# Patient Record
Sex: FEMALE | HISPANIC OR LATINO | Employment: UNEMPLOYED | URBAN - METROPOLITAN AREA
[De-identification: names, ages, dates, MRNs, and addresses within clinical notes are randomized per-mention and may not be internally consistent; named-entity substitution may affect disease eponyms.]

---

## 2022-03-07 ENCOUNTER — OFFICE VISIT (OUTPATIENT)
Dept: FAMILY MEDICINE CLINIC | Facility: CLINIC | Age: 47
End: 2022-03-07

## 2022-03-07 VITALS
SYSTOLIC BLOOD PRESSURE: 110 MMHG | HEIGHT: 62 IN | DIASTOLIC BLOOD PRESSURE: 70 MMHG | HEART RATE: 70 BPM | BODY MASS INDEX: 28.82 KG/M2 | WEIGHT: 156.6 LBS | OXYGEN SATURATION: 99 % | TEMPERATURE: 97.5 F

## 2022-03-07 DIAGNOSIS — N92.1 MENORRHAGIA WITH IRREGULAR CYCLE: ICD-10-CM

## 2022-03-07 DIAGNOSIS — R53.83 FATIGUE, UNSPECIFIED TYPE: Primary | ICD-10-CM

## 2022-03-07 PROCEDURE — 99203 OFFICE O/P NEW LOW 30 MIN: CPT | Performed by: FAMILY MEDICINE

## 2022-03-07 RX ORDER — DIPHENOXYLATE HYDROCHLORIDE AND ATROPINE SULFATE 2.5; .025 MG/1; MG/1
1 TABLET ORAL DAILY
COMMUNITY

## 2022-03-07 RX ORDER — FERROUS SULFATE 325(65) MG
325 TABLET ORAL
COMMUNITY

## 2022-03-07 RX ORDER — ERGOCALCIFEROL 1.25 MG/1
1000 CAPSULE ORAL
COMMUNITY

## 2022-03-07 NOTE — PROGRESS NOTES
"Chief Complaint  Establish Care (Wants to check on menopause also had covid in January and hasnt felt normal since)    Subjective          Rama Meza presents to [facility] to establish care for menopause and anemia. She is a new patient to me today. Patient agrees to using [facility].     History of Present Illness    Patient reports a chronic history of anemia and reports her hemoglobin is rarely within normal range. Her last hemoglobin value was 12.5 in 2021. She also reports frequent fatigue which has recently improved with daily iron supplements 25 mg within the last 6 weeks. She reports previously doing at least 3 hours of exercise daily between tennis, yoga and jazzercise but is unable to participate as much due to fatigue. Patient had a metabolic panel performed in 2021 which was all in normal range.     She was positive for COVID in 2022 and continues to experience residual symptoms. She reports feeling shortness of breath and lightheaded often with activity. She denies chest pain.     Patient reports her menstrual cycles are becoming heavier, lasting longer and have become more frequent since 2021. She notes her hands and feet feeling extremely cold, followed by cold sweats several nights before her periods onset. She also reports sleep disturbance and frequent headaches and dizziness. Patient also occasionally wakes up with her head pulsing as if 'her heart was in her head'.     Patient states she has been experiencing sore throat for 2 to 3 days.     Patient complains of 3 to 4 UTIs a year which she describes as painful.     Patient also reports being borderline diabetic in the past    Patient reports a surgical history of , wisdom tooth removal and dry eye surgery    Objective      Vital Signs:   /70   Pulse 70   Temp 97.5 °F (36.4 °C)   Ht 157.5 cm (62\")   Wt 71 kg (156 lb 9.6 oz)   SpO2 99%   BMI 28.64 kg/m²       Physical Exam  Vitals and nursing " note reviewed.   Constitutional:       Appearance: Normal appearance. She is well-developed.   HENT:      Head: Normocephalic and atraumatic.      Right Ear: External ear normal.      Left Ear: External ear normal.      Nose: Nose normal.   Eyes:      General: No scleral icterus.     Conjunctiva/sclera: Conjunctivae normal.   Cardiovascular:      Rate and Rhythm: Normal rate and regular rhythm.      Heart sounds: Normal heart sounds.   Pulmonary:      Effort: Pulmonary effort is normal.      Breath sounds: Normal breath sounds.   Musculoskeletal:      Cervical back: Normal range of motion and neck supple.      Right lower leg: No edema.      Left lower leg: No edema.   Lymphadenopathy:      Cervical: No cervical adenopathy.   Skin:     General: Skin is warm and dry.      Findings: No rash.   Neurological:      Mental Status: She is alert and oriented to person, place, and time.   Psychiatric:         Mood and Affect: Mood normal.         Behavior: Behavior normal.         Thought Content: Thought content normal.         Judgment: Judgment normal.        Result Review :                 Assessment and Plan    Diagnoses and all orders for this visit:    1. Fatigue, unspecified type (Primary)  Perform thyroid panel, metabolic panel, iron levels and CBC.  Patient to continue daily iron supplements.     2. Menorrhagia with irregular cycle  Encouraged patient to hydrate adequately.  Patient is likely starting perimenopausal symptoms.  Reassurance given.  Discussed hygiene tips.  Discussed cranberry tablets.         Follow Up   Return in about 3 months (around 6/7/2022) for Annual physical.    Patient was given instructions and counseling regarding her condition or for health maintenance advice. Please see specific information pulled into the AVS if appropriate.     Transcribed from ambient dictation for Dionna Diaz DO by Katelin Silva.  03/08/22   20:45 EST    Patient verbalized consent to the visit recording.

## 2022-03-08 LAB
ALBUMIN SERPL-MCNC: 4.3 G/DL (ref 3.8–4.8)
ALBUMIN/GLOB SERPL: 1.7 {RATIO} (ref 1.2–2.2)
ALP SERPL-CCNC: 50 IU/L (ref 44–121)
ALT SERPL-CCNC: 9 IU/L (ref 0–32)
AST SERPL-CCNC: 21 IU/L (ref 0–40)
BASOPHILS # BLD AUTO: 0.1 X10E3/UL (ref 0–0.2)
BASOPHILS NFR BLD AUTO: 1 %
BILIRUB SERPL-MCNC: 0.2 MG/DL (ref 0–1.2)
BUN SERPL-MCNC: 14 MG/DL (ref 6–24)
BUN/CREAT SERPL: 15 (ref 9–23)
CALCIUM SERPL-MCNC: 9.5 MG/DL (ref 8.7–10.2)
CHLORIDE SERPL-SCNC: 102 MMOL/L (ref 96–106)
CO2 SERPL-SCNC: 23 MMOL/L (ref 20–29)
CREAT SERPL-MCNC: 0.95 MG/DL (ref 0.57–1)
EGFR GENE MUT ANL BLD/T: 74 ML/MIN/1.73
EOSINOPHIL # BLD AUTO: 0 X10E3/UL (ref 0–0.4)
EOSINOPHIL NFR BLD AUTO: 1 %
ERYTHROCYTE [DISTWIDTH] IN BLOOD BY AUTOMATED COUNT: 13.3 % (ref 11.7–15.4)
FERRITIN SERPL-MCNC: 36 NG/ML (ref 15–150)
GLOBULIN SER CALC-MCNC: 2.5 G/DL (ref 1.5–4.5)
GLUCOSE SERPL-MCNC: 83 MG/DL (ref 65–99)
HCT VFR BLD AUTO: 36.5 % (ref 34–46.6)
HGB BLD-MCNC: 12.4 G/DL (ref 11.1–15.9)
IMM GRANULOCYTES # BLD AUTO: 0 X10E3/UL (ref 0–0.1)
IMM GRANULOCYTES NFR BLD AUTO: 0 %
IRON SATN MFR SERPL: 32 % (ref 15–55)
IRON SERPL-MCNC: 102 UG/DL (ref 27–159)
LYMPHOCYTES # BLD AUTO: 2.2 X10E3/UL (ref 0.7–3.1)
LYMPHOCYTES NFR BLD AUTO: 27 %
MCH RBC QN AUTO: 31.9 PG (ref 26.6–33)
MCHC RBC AUTO-ENTMCNC: 34 G/DL (ref 31.5–35.7)
MCV RBC AUTO: 94 FL (ref 79–97)
MONOCYTES # BLD AUTO: 0.5 X10E3/UL (ref 0.1–0.9)
MONOCYTES NFR BLD AUTO: 6 %
NEUTROPHILS # BLD AUTO: 5.3 X10E3/UL (ref 1.4–7)
NEUTROPHILS NFR BLD AUTO: 65 %
PLATELET # BLD AUTO: 217 X10E3/UL (ref 150–450)
POTASSIUM SERPL-SCNC: 4.2 MMOL/L (ref 3.5–5.2)
PROT SERPL-MCNC: 6.8 G/DL (ref 6–8.5)
RBC # BLD AUTO: 3.89 X10E6/UL (ref 3.77–5.28)
SODIUM SERPL-SCNC: 141 MMOL/L (ref 134–144)
TIBC SERPL-MCNC: 319 UG/DL (ref 250–450)
TSH SERPL DL<=0.005 MIU/L-ACNC: 2.6 UIU/ML (ref 0.45–4.5)
UIBC SERPL-MCNC: 217 UG/DL (ref 131–425)
WBC # BLD AUTO: 8.1 X10E3/UL (ref 3.4–10.8)

## 2022-03-09 ENCOUNTER — PATIENT ROUNDING (BHMG ONLY) (OUTPATIENT)
Dept: FAMILY MEDICINE CLINIC | Facility: CLINIC | Age: 47
End: 2022-03-09

## 2022-03-28 ENCOUNTER — OFFICE VISIT (OUTPATIENT)
Dept: OBSTETRICS AND GYNECOLOGY | Facility: CLINIC | Age: 47
End: 2022-03-28

## 2022-03-28 ENCOUNTER — PATIENT ROUNDING (BHMG ONLY) (OUTPATIENT)
Dept: OBSTETRICS AND GYNECOLOGY | Facility: CLINIC | Age: 47
End: 2022-03-28

## 2022-03-28 VITALS
HEIGHT: 62 IN | BODY MASS INDEX: 28.71 KG/M2 | DIASTOLIC BLOOD PRESSURE: 54 MMHG | WEIGHT: 156 LBS | SYSTOLIC BLOOD PRESSURE: 102 MMHG

## 2022-03-28 DIAGNOSIS — Z11.51 SPECIAL SCREENING EXAMINATION FOR HUMAN PAPILLOMAVIRUS (HPV): ICD-10-CM

## 2022-03-28 DIAGNOSIS — N92.0 MENORRHAGIA WITH REGULAR CYCLE: ICD-10-CM

## 2022-03-28 DIAGNOSIS — Z01.419 ROUTINE GYNECOLOGICAL EXAMINATION: Primary | ICD-10-CM

## 2022-03-28 DIAGNOSIS — Z01.419 PAP SMEAR, LOW-RISK: ICD-10-CM

## 2022-03-28 LAB
BILIRUB BLD-MCNC: NEGATIVE MG/DL
CLARITY, POC: CLEAR
COLOR UR: YELLOW
GLUCOSE UR STRIP-MCNC: NEGATIVE MG/DL
KETONES UR QL: NEGATIVE
LEUKOCYTE EST, POC: NEGATIVE
NITRITE UR-MCNC: NEGATIVE MG/ML
PH UR: 5 [PH] (ref 5–8)
PROT UR STRIP-MCNC: NEGATIVE MG/DL
RBC # UR STRIP: NEGATIVE /UL
SP GR UR: 1.02 (ref 1–1.03)
UROBILINOGEN UR QL: NORMAL

## 2022-03-28 PROCEDURE — 81002 URINALYSIS NONAUTO W/O SCOPE: CPT | Performed by: OBSTETRICS & GYNECOLOGY

## 2022-03-28 PROCEDURE — 99386 PREV VISIT NEW AGE 40-64: CPT | Performed by: OBSTETRICS & GYNECOLOGY

## 2022-03-28 RX ORDER — NORETHINDRONE ACETATE AND ETHINYL ESTRADIOL, ETHINYL ESTRADIOL AND FERROUS FUMARATE 1MG-10(24)
1 KIT ORAL DAILY
Qty: 28 TABLET | Refills: 11 | Status: SHIPPED | OUTPATIENT
Start: 2022-03-28 | End: 2022-04-19

## 2022-03-28 NOTE — PROGRESS NOTES
GYN Annual Exam     CC- Here for annual exam.     Rama Meza is a 46 y.o. female who presents for annual well woman exam. Pt is a new pt to me. She has not had a pap smear in many years. She presents with menstrual cycle complaints. Periods are every 3-4 weeks, lasting 3-10 days. Dysmenorrhea:moderate starting 3 days prior to cycle. Cyclic symptoms include none. No intermenstrual bleeding, spotting, or discharge.  Patient is sexually active  yes -  . Patient is satisfied with her contraception yes - condoms.     OB History    No obstetric history on file.           Current contraception: condoms  History of abnormal Pap smear: no  History of abnormal mammogram: no  Family history of uterine, colon or ovarian cancer: no  Family history of breast cancer: yes - 3 maternal aunts- unknown bc they live in [facility] Maintenance   Topic Date Due   • Annual Gynecologic Pelvic and Breast Exam  Never done   • COLORECTAL CANCER SCREENING  Never done   • ANNUAL PHYSICAL  Never done   • TDAP/TD VACCINES (3 - Tdap) 10/23/2018   • INFLUENZA VACCINE  Never done   • COVID-19 Vaccine (3 - Booster for Pfizer series) 2021   • HEPATITIS C SCREENING  Never done   • PAP SMEAR  Never done   • Pneumococcal Vaccine 0-64  Aged Out       Past Medical History:   Diagnosis Date   • Anemia        Past Surgical History:   Procedure Laterality Date   •  SECTION           Current Outpatient Medications:   •  Ascorbic Acid 500 MG chewable tablet, Chew 500 mg., Disp: , Rfl:   •  Cranberry 1000 MG capsule, Take  by mouth., Disp: , Rfl:   •  ergocalciferol (ERGOCALCIFEROL) 1.25 MG (69347 UT) capsule, Take 1,000 Units by mouth., Disp: , Rfl:   •  ferrous sulfate 325 (65 FE) MG tablet, Take 325 mg by mouth Daily With Breakfast., Disp: , Rfl:   •  Multiple Vitamins-Minerals (b complex-C-E-zinc) tablet, Take 1 tablet by mouth Daily., Disp: , Rfl:   •  multivitamin (THERAGRAN) tablet tablet, Take 1 tablet by mouth Daily.,  "Disp: , Rfl:   •  Norethin-Eth Estrad-Fe Biphas (Lo Loestrin Fe) 1 MG-10 MCG / 10 MCG tablet, Take 1 tablet by mouth Daily., Disp: 28 tablet, Rfl: 11    Allergies   Allergen Reactions   • Caffeine Other (See Comments)     Lose eyesight for 4-5 hours  Lose eyesight for 4-5 hours     • Gluten Meal Dizziness   • Lactase-Lactobacillus Dizziness   • Amoxicillin Palpitations and Rash   • Biaxin [Clarithromycin] Palpitations and Rash       Social History     Tobacco Use   • Smoking status: Never Smoker   • Smokeless tobacco: Never Used   Vaping Use   • Vaping Use: Never used   Substance Use Topics   • Alcohol use: Yes     Alcohol/week: 1.0 standard drink     Types: 1 Standard drinks or equivalent per week     Comment: rarely   • Drug use: Never       Family History   Problem Relation Age of Onset   • Hyperlipidemia Mother    • Hypertension Mother    • Kidney disease Father    • Hyperlipidemia Father    • Hypertension Father    • Cancer Maternal Aunt    • Hypertension Maternal Grandmother    • Hyperlipidemia Maternal Grandmother    • Vision loss Maternal Grandfather    • Hypertension Maternal Grandfather    • Hyperlipidemia Maternal Grandfather    • Hypertension Paternal Grandmother    • Hyperlipidemia Paternal Grandmother    • Heart disease Paternal Grandmother    • Hypertension Paternal Grandfather    • Hyperlipidemia Paternal Grandfather        Review of Systems   Constitutional: Negative for appetite change, chills, fatigue, fever and unexpected weight change.   Gastrointestinal: Negative for abdominal distention, abdominal pain, anal bleeding, blood in stool, constipation, diarrhea, nausea and vomiting.   Genitourinary: Positive for menstrual problem. Negative for dyspareunia, dysuria, pelvic pain, vaginal bleeding, vaginal discharge and vaginal pain.       /54   Ht 157.5 cm (62.01\")   Wt 70.8 kg (156 lb)   LMP 03/19/2022   Breastfeeding No   BMI 28.53 kg/m²     Physical Exam  Vitals reviewed. "   Constitutional:       Appearance: She is well-developed.   HENT:      Mouth/Throat:      Dentition: Normal dentition. No dental caries.   Cardiovascular:      Rate and Rhythm: Normal rate and regular rhythm.      Heart sounds: Normal heart sounds.   Pulmonary:      Effort: Pulmonary effort is normal. No respiratory distress.      Breath sounds: Normal breath sounds. No stridor. No wheezing.   Chest:   Breasts:      Right: No inverted nipple, mass, nipple discharge, skin change or tenderness.      Left: No inverted nipple, mass, nipple discharge, skin change or tenderness.       Abdominal:      General: There is no distension.      Palpations: Abdomen is soft. There is no mass.      Tenderness: There is no abdominal tenderness.   Genitourinary:     Labia:         Right: No rash, tenderness or lesion.         Left: No rash, tenderness or lesion.       Vagina: No vaginal discharge, tenderness or bleeding.      Cervix: No cervical motion tenderness, discharge or friability.      Uterus: Not deviated, not enlarged, not fixed and not tender.       Adnexa:         Right: No mass, tenderness or fullness.          Left: No mass, tenderness or fullness.     Musculoskeletal:         General: No tenderness. Normal range of motion.   Skin:     General: Skin is warm.      Findings: No erythema or rash.   Neurological:      General: No focal deficit present.      Mental Status: She is alert and oriented to person, place, and time. Mental status is at baseline.      Cranial Nerves: No cranial nerve deficit.      Coordination: Coordination normal.   Psychiatric:         Mood and Affect: Mood normal.         Behavior: Behavior normal.         Thought Content: Thought content normal.         Judgment: Judgment normal.            Assessment/Plan    1) GYN HM: check pap smear. Check MMG.  SBE demonstrated and encouraged.  2) AUB: has had hx of anemia and is on iron daily. Her most recent CBC, ferritin and TSH are all normal. Rx Lo  Loestrin  3) Contraception: Condoms  4) Family Planning:  14yo son  5) Diet and Exercise discussed  6) Smoking Status: nonsmoker  7) Social: Katie Ruiz is her friend. Her son goes to [employer]  8) Follow up prn and 1 year       Diagnoses and all orders for this visit:    Routine gynecological examination  -     POC Urinalysis Dipstick  -     Mammo Screening Bilateral With CAD; Future    Pap smear, low-risk  -     IgP, Aptima HPV    Special screening examination for human papillomavirus (HPV)  -     IgP, Aptima HPV    Menorrhagia with regular cycle    Other orders  -     Multiple Vitamins-Minerals (b complex-C-E-zinc) tablet; Take 1 tablet by mouth Daily.  -     Cranberry 1000 MG capsule; Take  by mouth.  -     Norethin-Eth Estrad-Fe Biphas (Lo Loestrin Fe) 1 MG-10 MCG / 10 MCG tablet; Take 1 tablet by mouth Daily.          Ileana Garcias,   3/28/2022  13:40 EDT

## 2022-03-28 NOTE — PROGRESS NOTES
A MY-CHART MESSAGE HAS BEEN SENT TO THE PATIENT FOR PATIENT ROUNDING WITH [facility]

## 2022-04-01 LAB
CYTOLOGIST CVX/VAG CYTO: NORMAL
CYTOLOGY CVX/VAG DOC CYTO: NORMAL
CYTOLOGY CVX/VAG DOC THIN PREP: NORMAL
DX ICD CODE: NORMAL
HIV 1 & 2 AB SER-IMP: NORMAL
HPV I/H RISK 4 DNA CVX QL PROBE+SIG AMP: NEGATIVE
OTHER STN SPEC: NORMAL
STAT OF ADQ CVX/VAG CYTO-IMP: NORMAL

## 2022-04-15 ENCOUNTER — HOSPITAL ENCOUNTER (OUTPATIENT)
Dept: MAMMOGRAPHY | Facility: HOSPITAL | Age: 47
Discharge: HOME | End: 2022-04-15
Admitting: OBSTETRICS & GYNECOLOGY

## 2022-04-15 DIAGNOSIS — Z01.419 ROUTINE GYNECOLOGICAL EXAMINATION: ICD-10-CM

## 2022-04-15 PROCEDURE — 77067 SCR MAMMO BI INCL CAD: CPT

## 2022-04-18 ENCOUNTER — TELEPHONE (OUTPATIENT)
Dept: OBSTETRICS AND GYNECOLOGY | Facility: CLINIC | Age: 47
End: 2022-04-18

## 2022-04-18 NOTE — TELEPHONE ENCOUNTER
Patient calling states the Lo Lo you prescribed for is to expensive and would like to know if there is something else you can give her?

## 2022-04-19 RX ORDER — NORETHINDRONE ACETATE AND ETHINYL ESTRADIOL AND FERROUS FUMARATE 1MG-20(24)
1 KIT ORAL DAILY
Qty: 84 TABLET | Refills: 3 | Status: SHIPPED | OUTPATIENT
Start: 2022-04-19 | End: 2022-10-03

## 2022-04-19 NOTE — TELEPHONE ENCOUNTER
I can prescribe the Loestrin instead of the Lo Loestrin. It is a little higher of a dosage but there is a generic. Let me know if she wants me to prescribe it.

## 2022-10-03 ENCOUNTER — OFFICE VISIT (OUTPATIENT)
Dept: FAMILY MEDICINE CLINIC | Facility: CLINIC | Age: 47
End: 2022-10-03

## 2022-10-03 VITALS
BODY MASS INDEX: 28.16 KG/M2 | OXYGEN SATURATION: 98 % | SYSTOLIC BLOOD PRESSURE: 108 MMHG | TEMPERATURE: 98.2 F | HEIGHT: 62 IN | HEART RATE: 87 BPM | WEIGHT: 153 LBS | DIASTOLIC BLOOD PRESSURE: 70 MMHG

## 2022-10-03 DIAGNOSIS — D37.01 NEOPLASM OF UNCERTAIN BEHAVIOR OF LIP: ICD-10-CM

## 2022-10-03 DIAGNOSIS — Z12.11 SCREENING FOR MALIGNANT NEOPLASM OF COLON: ICD-10-CM

## 2022-10-03 DIAGNOSIS — R41.3 MEMORY LOSS: ICD-10-CM

## 2022-10-03 DIAGNOSIS — Z00.00 ENCOUNTER FOR WELLNESS EXAMINATION IN ADULT: Primary | ICD-10-CM

## 2022-10-03 DIAGNOSIS — Z13.6 ENCOUNTER FOR LIPID SCREENING FOR CARDIOVASCULAR DISEASE: ICD-10-CM

## 2022-10-03 DIAGNOSIS — Z13.220 ENCOUNTER FOR LIPID SCREENING FOR CARDIOVASCULAR DISEASE: ICD-10-CM

## 2022-10-03 DIAGNOSIS — Z86.2 HISTORY OF IRON DEFICIENCY ANEMIA: ICD-10-CM

## 2022-10-03 PROCEDURE — 99396 PREV VISIT EST AGE 40-64: CPT | Performed by: FAMILY MEDICINE

## 2022-10-03 NOTE — PROGRESS NOTES
Subjective   Rama Meza is a 46 y.o. female who presents for annual female wellness exam.  Chief Complaint   Patient presents with   • Annual Exam     No pap    Patient has a history of anemia due to heavy menstrual cycles.  She currently is taking iron.    Patient also reports issues over the last several months with memory loss.  The patient states that she always has to use Google maps when she goes anywhere because she will get lost.  She has trouble recalling events from years ago.  Patient states that she has to write everything down.  She started noticing this when she was not sleeping well.  She has been attributing it to a poor sleep.  Patient does not feel distracted.    Patient has already made an appointment with dermatology for a mole on her lip.  She would like referral.  She will be seen next week.     Menstrual History: regular but heavy menses. Monthly. [facility]  Pregnancy History:   Sexual History: Monogamous male partner for the past 28 years.  Contraception: condoms  Diet: Healthy low carb, lots of veggies  Exercise: tennis 5 times a week and Jazzersize 2-3 times a week  Do you feel safe? yes    Mammogram: 4/15/2022, negative  Pap Smear: 3/28/2022 pap and HPV negative  Bone Density: NA  Colon Cancer Screening: No FH of colon CA.      Immunization History   Administered Date(s) Administered   • COVID-19 (PFIZER) PURPLE CAP 2021       The following portions of the patient's history were reviewed and updated as appropriate: allergies, current medications, past family history, past medical history, past social history, past surgical history and problem list.    Past Medical History:   Diagnosis Date   • Allergic     Seasonal, started when moved to [place]   • Anemia    • Clotting disorder (HCC) 2021    Very heavy periods   • Scoliosis Teenage years    Also lordosis   • Urinary tract infection     About 3 or 4 per year   • Visual impairment 2015    Severe dry eye       Past  Surgical History:   Procedure Laterality Date   •  SECTION     • EYE SURGERY  2017    Stint for dry eye       Family History   Problem Relation Age of Onset   • Hyperlipidemia Mother    • Hypertension Mother    • Kidney disease Father    • Hyperlipidemia Father    • Hypertension Father    • Cancer Maternal Aunt    • Hypertension Maternal Grandmother    • Hyperlipidemia Maternal Grandmother    • Arthritis Maternal Grandmother    • Vision loss Maternal Grandfather    • Hypertension Maternal Grandfather    • Hyperlipidemia Maternal Grandfather    • Hypertension Paternal Grandmother    • Hyperlipidemia Paternal Grandmother    • Heart disease Paternal Grandmother    • Stroke Paternal Grandmother    • Hypertension Paternal Grandfather    • Hyperlipidemia Paternal Grandfather        Social History     Socioeconomic History   • Marital status:    Tobacco Use   • Smoking status: Never Smoker   • Smokeless tobacco: Never Used   Vaping Use   • Vaping Use: Never used   Substance and Sexual Activity   • Alcohol use: Yes     Alcohol/week: 1.0 standard drink     Types: 1 Shots of liquor per week     Comment: Single malt scotch maybe 1x or 2x per week   • Drug use: Never   • Sexual activity: Yes     Partners: Male     Birth control/protection: Condom       Review of Systems   Constitutional: Negative for activity change, appetite change, fatigue and unexpected weight change.   HENT: Negative for congestion, ear pain, nosebleeds, sore throat and tinnitus.    Eyes: Negative for pain, redness and visual disturbance.   Respiratory: Negative for cough, shortness of breath and wheezing.    Cardiovascular: Negative for chest pain, palpitations and leg swelling.   Gastrointestinal: Negative for abdominal pain, blood in stool and nausea.   Endocrine: Negative for polydipsia and polyuria.   Genitourinary: Negative for dysuria, frequency, menstrual problem and vaginal discharge.   Musculoskeletal: Negative for arthralgias,  joint swelling and myalgias.   Skin: Negative for rash.   Allergic/Immunologic: Negative for environmental allergies, food allergies and immunocompromised state.   Neurological: Negative for dizziness, speech difficulty, weakness and headaches.   Hematological: Negative for adenopathy. Does not bruise/bleed easily.   Psychiatric/Behavioral: Negative for decreased concentration and dysphoric mood. The patient is not nervous/anxious.        Objective   Vitals:    10/03/22 1043   BP: 108/70   Pulse: 87   Temp: 98.2 °F (36.8 °C)   SpO2: 98%     Body mass index is 27.98 kg/m².  Physical Exam  Vitals and nursing note reviewed.   Constitutional:       Appearance: Normal appearance. She is well-developed.   HENT:      Head: Normocephalic and atraumatic.      Right Ear: External ear normal.      Left Ear: External ear normal.      Nose: Nose normal.   Eyes:      General: No scleral icterus.     Conjunctiva/sclera: Conjunctivae normal.   Cardiovascular:      Rate and Rhythm: Normal rate and regular rhythm.      Heart sounds: Normal heart sounds.   Pulmonary:      Effort: Pulmonary effort is normal.      Breath sounds: Normal breath sounds.   Musculoskeletal:      Cervical back: Normal range of motion and neck supple.      Right lower leg: No edema.      Left lower leg: No edema.   Lymphadenopathy:      Cervical: No cervical adenopathy.   Skin:     General: Skin is warm and dry.      Findings: No rash.   Neurological:      Mental Status: She is alert and oriented to person, place, and time.   Psychiatric:         Mood and Affect: Mood normal.         Behavior: Behavior normal.         Thought Content: Thought content normal.         Judgment: Judgment normal.           Assessment & Plan   Diagnoses and all orders for this visit:    1. Encounter for wellness examination in adult (Primary)    2. History of iron deficiency anemia  -     CBC & Differential    3. Screening for malignant neoplasm of colon  -     Cologuard - Stool,  Per Rectum; Future    4. 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 Encounter for lipid screening for cardiovascular disease  -     Lipid Panel      Routine health maintenance.  Patient is up-to-date with mammogram and Pap smear.  She has not been screened for colon cancer.  She elects to have Cologuard.  Order was entered.    Discussed the importance of maintaining a healthy weight and getting regular exercise.  Educated patient on the benefits of healthy diet.  Advise follow-up annually for wellness exams.      There are no Patient Instructions on file for this visit.

## 2022-10-04 LAB
BASOPHILS # BLD AUTO: 0.1 X10E3/UL (ref 0–0.2)
BASOPHILS NFR BLD AUTO: 1 %
CHOLEST SERPL-MCNC: 235 MG/DL (ref 100–199)
EOSINOPHIL # BLD AUTO: 0.1 X10E3/UL (ref 0–0.4)
EOSINOPHIL NFR BLD AUTO: 1 %
ERYTHROCYTE [DISTWIDTH] IN BLOOD BY AUTOMATED COUNT: 13.1 % (ref 11.7–15.4)
HCT VFR BLD AUTO: 37.8 % (ref 34–46.6)
HDLC SERPL-MCNC: 56 MG/DL
HGB BLD-MCNC: 12.6 G/DL (ref 11.1–15.9)
IMM GRANULOCYTES # BLD AUTO: 0 X10E3/UL (ref 0–0.1)
IMM GRANULOCYTES NFR BLD AUTO: 0 %
LDLC SERPL CALC-MCNC: 159 MG/DL (ref 0–99)
LYMPHOCYTES # BLD AUTO: 2.8 X10E3/UL (ref 0.7–3.1)
LYMPHOCYTES NFR BLD AUTO: 40 %
MCH RBC QN AUTO: 32.2 PG (ref 26.6–33)
MCHC RBC AUTO-ENTMCNC: 33.3 G/DL (ref 31.5–35.7)
MCV RBC AUTO: 97 FL (ref 79–97)
MONOCYTES # BLD AUTO: 0.5 X10E3/UL (ref 0.1–0.9)
MONOCYTES NFR BLD AUTO: 7 %
NEUTROPHILS # BLD AUTO: 3.6 X10E3/UL (ref 1.4–7)
NEUTROPHILS NFR BLD AUTO: 51 %
PLATELET # BLD AUTO: 186 X10E3/UL (ref 150–450)
RBC # BLD AUTO: 3.91 X10E6/UL (ref 3.77–5.28)
TRIGL SERPL-MCNC: 114 MG/DL (ref 0–149)
VLDLC SERPL CALC-MCNC: 20 MG/DL (ref 5–40)
WBC # BLD AUTO: 7.1 X10E3/UL (ref 3.4–10.8)

## 2023-01-25 ENCOUNTER — OFFICE VISIT (OUTPATIENT)
Dept: ORTHOPEDIC SURGERY | Facility: CLINIC | Age: 48
End: 2023-01-25
Payer: COMMERCIAL

## 2023-01-25 ENCOUNTER — TELEPHONE (OUTPATIENT)
Dept: ORTHOPEDIC SURGERY | Facility: CLINIC | Age: 48
End: 2023-01-25
Payer: COMMERCIAL

## 2023-01-25 VITALS — HEIGHT: 62 IN | BODY MASS INDEX: 27.23 KG/M2 | WEIGHT: 148 LBS

## 2023-01-25 DIAGNOSIS — S52.531A CLOSED COLLES' FRACTURE OF RIGHT RADIUS, INITIAL ENCOUNTER: Primary | ICD-10-CM

## 2023-01-25 PROCEDURE — 99204 OFFICE O/P NEW MOD 45 MIN: CPT | Performed by: NURSE PRACTITIONER

## 2023-01-25 RX ORDER — IBUPROFEN 200 MG
200 TABLET ORAL AS NEEDED
COMMUNITY

## 2023-01-25 RX ORDER — HYDROCODONE BITARTRATE AND ACETAMINOPHEN 5; 325 MG/1; MG/1
2 TABLET ORAL EVERY 4 HOURS PRN
Qty: 24 TABLET | Refills: 0 | Status: SHIPPED | OUTPATIENT
Start: 2023-01-25 | End: 2023-01-30 | Stop reason: SDUPTHER

## 2023-01-25 NOTE — TELEPHONE ENCOUNTER
ATTEMPTED TO WT, CALL ANSWERED, IMMEDIATELY DISCONNECTED     Caller: MANUEL DE DIOS    Relationship to patient: PATIENT    Best call back number:[phone]    Patient is needing: SCHEDULING WITH DR CASTORENA.  PATIENT CALLED TO CONFIRM, AT TIME OF CALL NO APPOINTMENT SCHEDULED .  PLEASE CALL PATIENT TO DISCUSS

## 2023-01-25 NOTE — PROGRESS NOTES
Patient Name: Rama Meza   YOB: 1975  Referring Primary Care Physician: Dionna Diaz DO  BMI: Body mass index is 27.07 kg/m².    Chief Complaint:    Chief Complaint   Patient presents with   • Right Wrist - Pain        HPI:  RHD female that presents with right wrist pain after falling on outstretched hand playing tennis. She went to urgent care yest and was placed in a cock up splint and sling. Pt has been taking ibuprofen and tylenol with little relief.       Rama Meza is a 47 y.o. female who presents today for evaluation of   Chief Complaint   Patient presents with   • Right Wrist - Pain         Subjective   Medications:   Home Medications:  Current Outpatient Medications on File Prior to Visit   Medication Sig   • Ascorbic Acid 500 MG chewable tablet Chew 500 mg.   • Cranberry 1000 MG capsule Take  by mouth.   • ergocalciferol (ERGOCALCIFEROL) 1.25 MG (94187 UT) capsule Take 1,000 Units by mouth.   • ferrous sulfate 325 (65 FE) MG tablet Take 325 mg by mouth Daily With Breakfast.   • ibuprofen (ADVIL,MOTRIN) 200 MG tablet Take 200 mg by mouth As Needed for Mild Pain.   • Multiple Vitamins-Minerals (b complex-C-E-zinc) tablet Take 1 tablet by mouth Daily.   • multivitamin (THERAGRAN) tablet tablet Take 1 tablet by mouth Daily.     Current Facility-Administered Medications on File Prior to Visit   Medication   • [COMPLETED] ibuprofen (ADVIL,MOTRIN) tablet 800 mg     Current Medications:  Scheduled Meds:  Continuous Infusions:No current facility-administered medications for this visit.    PRN Meds:.    I have reviewed the patient's medical history in detail and updated the computerized patient record.  Review and summarization of old records includes:    Past Medical History:   Diagnosis Date   • Allergic 2000    Seasonal, started when moved to [place]   • Anemia    • Clotting disorder (HCC) 09/2021    Very heavy periods   • Scoliosis Teenage years    Also lordosis   • Urinary tract infection      About 3 or 4 per year   • Visual impairment     Severe dry eye        Past Surgical History:   Procedure Laterality Date   •  SECTION  2009   • EYE SURGERY  2017    Stint for dry eye        Social History     Occupational History   • Not on file   Tobacco Use   • Smoking status: Never     Passive exposure: Never   • Smokeless tobacco: Never   Vaping Use   • Vaping Use: Never used   Substance and Sexual Activity   • Alcohol use: Yes     Alcohol/week: 1.0 standard drink     Types: 1 Shots of liquor per week     Comment: Single malt scotch maybe 1x or 2x per week   • Drug use: Never   • Sexual activity: Yes     Partners: Male     Birth control/protection: Condom      Social History     Social History Narrative   • Not on file        Family History   Problem Relation Age of Onset   • Hyperlipidemia Mother    • Hypertension Mother    • Kidney disease Father    • Hyperlipidemia Father    • Hypertension Father    • Cancer Maternal Aunt    • Hypertension Maternal Grandmother    • Hyperlipidemia Maternal Grandmother    • Arthritis Maternal Grandmother    • Vision loss Maternal Grandfather    • Hypertension Maternal Grandfather    • Hyperlipidemia Maternal Grandfather    • Hypertension Paternal Grandmother    • Hyperlipidemia Paternal Grandmother    • Heart disease Paternal Grandmother    • Stroke Paternal Grandmother    • Hypertension Paternal Grandfather    • Hyperlipidemia Paternal Grandfather        ROS: 14 point review of systems was performed and all other systems were reviewed and are negative except for documented findings in HPI and today's encounter.     Allergies:   Allergies   Allergen Reactions   • Caffeine Other (See Comments)     loses eyesight for 4-5 hours     • Gluten Meal Dizziness   • Lactase-Lactobacillus Dizziness   • Amoxicillin Palpitations and Rash   • Biaxin [Clarithromycin] Palpitations and Rash     Constitutional:  Denies fever, shaking or chills   Eyes:  Denies change in visual  "acuity   HENT:  Denies nasal congestion or sore throat   Respiratory:  Denies cough or shortness of breath   Cardiovascular:  Denies chest pain or severe LE edema   GI:  Denies abdominal pain, nausea, vomiting, bloody stools or diarrhea   Musculoskeletal:  Numbness, tingling, pain, or loss of motor function only as noted above in history of present illness.  : Denies painful urination or hematuria  Integument:  Denies rash, lesion or ulceration   Neurologic:  Denies headache or focal weakness  Endocrine:  Denies lymphadenopathy  Psych:  Denies confusion or change in mental status   Hem:  Denies active bleeding    OBJECTIVE:  Physical Exam: 47 y.o. female  Wt Readings from Last 3 Encounters:   01/25/23 67.1 kg (148 lb)   10/03/22 69.4 kg (153 lb)   03/28/22 70.8 kg (156 lb)     Ht Readings from Last 1 Encounters:   01/25/23 157.5 cm (62\")     Body mass index is 27.07 kg/m².  There were no vitals filed for this visit.  Vital signs reviewed.     General Appearance:    Alert, cooperative, in no acute distress                  Eyes: conjunctiva clear  ENT: external ears and nose atraumatic  CV: no peripheral edema  Resp: normal respiratory effort  Skin: no rashes or wounds; normal turgor  Psych: mood and affect appropriate  Lymph: no nodes appreciated  Neuro: gross sensation intact  Vascular:  Palpable peripheral pulse in noted extremity  Musculoskeletal Extremities: skin warm,dry and intact with capp refill intact and nvi, splint removed and skin inspected.  Some shortening and deformity noticed of the wrist skin appears benign there is no lacerations or abrasions there is focal tenderness the distal radius and ulna there is good strength in the fingers with intact sensation brisk cap refill and palpable pulses Exos splint was reapplied    Radiology: X-rays were uploaded from urgent care center reviewed and showed distal comminuted radial fracture with ulnar styloid  Right wrist xrays done at urgent care and reviewed " and show distal radius fracture comminuted     Assessment:     ICD-10-CM ICD-9-CM   1. Closed Colles' fracture of right radius, initial encounter  S52.531A 813.41        Procedures    exos splint and follow up with [facility] and [facility]     KAYLA query complete. Treatment plan to include limited course of prescribed  controlled substance. Risks including addiction, benefits, and alternatives presented to patient.     MDM/Plan:   The diagnosis(es), natural history, pathophysiology and treatment for diagnosis(es) were discussed. Opportunity given and questions answered.  Biomechanics of pertinent body areas discussed.  When appropriate, the use of ambulatory aids discussed.  EXERCISES:  Advice on benefits of, and types of regular/moderate exercise pertaining to orthopedic diagnosis(es).  MEDICATIONS:  The risks, benefits, warnings,side effects and alternatives of medications discussed.  Inflammation/pain control; with cold, heat, elevation and/or liniments discussed as appropriate  SPECIALTY REFERRAL  MEDICAL RECORDS reviewed from other provider(s) for past and current medical history pertinent to this complaint.      1/25/2023    Much of this encounter note is an electronic transcription/translation of spoken language to printed text. The electronic translation of spoken language may permit erroneous, or at times, nonsensical words or phrases to be inadvertently transcribed; Although I have reviewed the note for such errors, some may still exist

## 2023-01-25 NOTE — TELEPHONE ENCOUNTER
PATIENT HAS A FX OF THE RT WRIST FROM 1/24/23 AND SAW ALIRIO TODAY. ALIRIO WANTS HER TO BE SEEN IN ONE WEEK. I ASKED HER IF SHE COULD DO 1/8/23 AT [facility] AND SHE SAID THAT WAS FINE. IS IT POSSIBLE TO PUT HER IN AT 11:40 ON THAT DAY? PLEASE ADVISE

## 2023-01-27 ENCOUNTER — PATIENT ROUNDING (BHMG ONLY) (OUTPATIENT)
Dept: ORTHOPEDIC SURGERY | Facility: CLINIC | Age: 48
End: 2023-01-27
Payer: COMMERCIAL

## 2023-01-27 NOTE — PROGRESS NOTES
A Yebhi Message has been sent to the patient for PATIENT ROUNDING with [facility]

## 2023-01-30 DIAGNOSIS — S52.531A CLOSED COLLES' FRACTURE OF RIGHT RADIUS, INITIAL ENCOUNTER: ICD-10-CM

## 2023-01-30 RX ORDER — HYDROCODONE BITARTRATE AND ACETAMINOPHEN 5; 325 MG/1; MG/1
2 TABLET ORAL EVERY 4 HOURS PRN
Qty: 24 TABLET | Refills: 0 | Status: SHIPPED | OUTPATIENT
Start: 2023-01-30 | End: 2023-02-03 | Stop reason: SDUPTHER

## 2023-01-30 NOTE — TELEPHONE ENCOUNTER
Patient is requesting refill for Hydrocodone   Last fill: 1/25/23  Last ov: 1/25/23    Next appointment 2/01/23    Please review and advise [phone]

## 2023-02-01 ENCOUNTER — OFFICE VISIT (OUTPATIENT)
Dept: ORTHOPEDIC SURGERY | Facility: CLINIC | Age: 48
End: 2023-02-01
Payer: COMMERCIAL

## 2023-02-01 VITALS — HEIGHT: 62 IN | WEIGHT: 150.1 LBS | BODY MASS INDEX: 27.62 KG/M2 | TEMPERATURE: 97.4 F

## 2023-02-01 DIAGNOSIS — S52.531A CLOSED COLLES' FRACTURE OF RIGHT RADIUS, INITIAL ENCOUNTER: Primary | ICD-10-CM

## 2023-02-01 PROCEDURE — 99214 OFFICE O/P EST MOD 30 MIN: CPT | Performed by: ORTHOPAEDIC SURGERY

## 2023-02-01 PROCEDURE — 73110 X-RAY EXAM OF WRIST: CPT | Performed by: ORTHOPAEDIC SURGERY

## 2023-02-01 PROCEDURE — 25600 CLTX DST RDL FX/EPHYS SEP WO: CPT | Performed by: ORTHOPAEDIC SURGERY

## 2023-02-01 NOTE — PROGRESS NOTES
History & Physical       Patient: Rama Meza    YOB: 1975    Medical Record Number: [id number]    Chief Complaints: Right wrist injury    History of Present Illness: 47 y.o. female presents for evaluation of the right wrist.  The injury was sustained in a fall while playing tennis about a week ago.  She was seen by Aylin and placed in a brace.  She has been compliant with use of the brace but it is uncomfortable.  Current pain is described as moderate, constant, and aching.  Pain is worse with movement.  Rest, the splint, and pain medicine and have all helped.  She is right hand dominant.  She reports normal use and function of the right wrist prior to this injury.    Allergies:   Allergies   Allergen Reactions   • Caffeine Other (See Comments)     loses eyesight for 4-5 hours     • Gluten Meal Dizziness   • Lactase-Lactobacillus Dizziness   • Amoxicillin Palpitations and Rash   • Biaxin [Clarithromycin] Palpitations and Rash       Home Medications:      Current Outpatient Medications:   •  Ascorbic Acid 500 MG chewable tablet, Chew 500 mg., Disp: , Rfl:   •  Cranberry 1000 MG capsule, Take  by mouth., Disp: , Rfl:   •  ergocalciferol (ERGOCALCIFEROL) 1.25 MG (30766 UT) capsule, Take 1,000 Units by mouth., Disp: , Rfl:   •  ferrous sulfate 325 (65 FE) MG tablet, Take 325 mg by mouth Daily With Breakfast., Disp: , Rfl:   •  HYDROcodone-acetaminophen (NORCO) 5-325 MG per tablet, Take 2 tablets by mouth Every 4 (Four) Hours As Needed for Moderate Pain (1-2 po every 4 hours)., Disp: 24 tablet, Rfl: 0  •  ibuprofen (ADVIL,MOTRIN) 200 MG tablet, Take 200 mg by mouth As Needed for Mild Pain., Disp: , Rfl:   •  Multiple Vitamins-Minerals (b complex-C-E-zinc) tablet, Take 1 tablet by mouth Daily., Disp: , Rfl:   •  multivitamin (THERAGRAN) tablet tablet, Take 1 tablet by mouth Daily., Disp: , Rfl:     Past Medical History:   Diagnosis Date   • Allergic 2000    Seasonal, started when moved to [place]   •  Anemia    • Clotting disorder (HCC) 2021    Very heavy periods   • Scoliosis Teenage years    Also lordosis   • Urinary tract infection     About 3 or 4 per year   • Visual impairment 2015    Severe dry eye          Past Surgical History:   Procedure Laterality Date   •  SECTION     • EYE SURGERY  2017    Stint for dry eye          Social History     Occupational History   • Not on file   Tobacco Use   • Smoking status: Never     Passive exposure: Never   • Smokeless tobacco: Never   Vaping Use   • Vaping Use: Never used   Substance and Sexual Activity   • Alcohol use: Yes     Alcohol/week: 1.0 standard drink     Types: 1 Shots of liquor per week     Comment: Single malt scotch maybe 1x or 2x per week   • Drug use: Never   • Sexual activity: Yes     Partners: Male     Birth control/protection: Condom      Social History     Social History Narrative   • Not on file          Family History   Problem Relation Age of Onset   • Hyperlipidemia Mother    • Hypertension Mother    • Kidney disease Father    • Hyperlipidemia Father    • Hypertension Father    • Cancer Maternal Aunt    • Hypertension Maternal Grandmother    • Hyperlipidemia Maternal Grandmother    • Arthritis Maternal Grandmother    • Vision loss Maternal Grandfather    • Hypertension Maternal Grandfather    • Hyperlipidemia Maternal Grandfather    • Hypertension Paternal Grandmother    • Hyperlipidemia Paternal Grandmother    • Heart disease Paternal Grandmother    • Stroke Paternal Grandmother    • Hypertension Paternal Grandfather    • Hyperlipidemia Paternal Grandfather        Review of Systems:      Constitutional: Denies fever, shaking or chills   Eyes: Denies change in visual acuity   HEENT: Denies nasal congestion or sore throat   Respiratory: Denies cough or shortness of breath   Cardiovascular: Denies chest pain or edema  Endocrine: Denies tremors, palpitations, intolerance of heat or cold, polyuria, polydipsia.  GI: Denies abdominal  "pain, nausea, vomiting, bloody stools or diarrhea  : Denies frequency, urgency, incontinence, retention, or nocturia.  Musculoskeletal: Denies numbness tingling or loss of motor function except as above  Integument: Denies rash, lesion or ulceration   Neurologic: Denies headache or focal weakness, deficits  Heme: Denies epistaxis, spontaneous or excessive bleeding, epistaxis, hematuria, melena, fatigue, enlarged or tender lymph nodes.      All other pertinent positives and negatives as noted above in HPI.    Physical Exam: 47 y.o. female    Vitals:    02/01/23 1135   Temp: 97.4 °F (36.3 °C)   Weight: 68.1 kg (150 lb 1.6 oz)   Height: 157.5 cm (62\")       General:  Patient is awake and alert.  Appears in no acute distress or discomfort.    Psych:  Affect and demeanor are appropriate.    Eyes:  Conjunctiva and sclera appear grossly normal.  Eyes track well and EOM seem to be intact.    Dentition:  No gross abnormalities noted.    Ears:  No gross abnormalities.  Hearing adequate for the exam.    Cardiovascular:  Regular rate and rhythm.    Lungs:  Good chest expansion.  Breathing unlabored.    Lymph:  No palpable masses or adenopathy in the affected extremity    Right upper extremity:  Brace was in place and removed.  There is dorsal edema and ecchymosis at the wrist.  Skin appears benign.  No lacerations or abrasions.  Focal tenderness noted over the distal radius.  No tenderness in the hand or into the upper arm or elbow.  No palpable masses or adenopathy.  Compartments soft.  Painful, limited ROM of the wrist.  Could not assess stability due to discomfort with motion.  Good strength in the hand with finger flexion and extension as well as thumb abduction.  Intact sensation.  Brisk cap refill.  Palpable radial pulse.     Diagnostic Tests:  Lab Results   Component Value Date    GLUCOSE 83 03/07/2022    CALCIUM 9.5 03/07/2022     03/07/2022    K 4.2 03/07/2022    CO2 23 03/07/2022     03/07/2022    BUN 14 " 03/07/2022    CREATININE 0.95 03/07/2022    BCR 15 03/07/2022     Lab Results   Component Value Date    WBC 7.1 10/03/2022    HGB 12.6 10/03/2022    HCT 37.8 10/03/2022    MCV 97 10/03/2022     10/03/2022     No results found for: INR, PROTIME    Imaging: AP, oblique and lateral views right wrist are ordered and reviewed to evaluate her injury.  These are compared to previous x-rays.  The x-rays show a comminuted metaphyseal distal radius fracture.  She has roughly 5 degrees loss of radial inclination and 3 to 5 degrees of dorsal tilt.    Assessment:  Right distal radius fracture    Plan:  I recommend closed treatment of this injury.  Risks were discussed including nonunion, malunion, displacement necessitating operative intervention, arthrofibrosis, and persistent pain or motion loss necessitating further intervention.  I have recommended placement of a short arm cast.  A well-padded, well molded fiberglass cast was placed today without complication.  She tolerated this procedure well.  We will reconvene in 1 week for repeat x-rays.  We discussed appropriate activity modifications and restrictions including no lifting greater than 2 pounds, pushing, or pulling with the affected arm.  She already has pain medicine and will contact me if she needs a refill.  Risk of the medicine were discussed.    Date: 2/1/2023    Ralph Ray MD

## 2023-02-03 DIAGNOSIS — S52.531A CLOSED COLLES' FRACTURE OF RIGHT RADIUS, INITIAL ENCOUNTER: ICD-10-CM

## 2023-02-03 RX ORDER — HYDROCODONE BITARTRATE AND ACETAMINOPHEN 5; 325 MG/1; MG/1
2 TABLET ORAL EVERY 4 HOURS PRN
Qty: 24 TABLET | Refills: 0 | Status: SHIPPED | OUTPATIENT
Start: 2023-02-03 | End: 2023-03-27

## 2023-02-03 NOTE — TELEPHONE ENCOUNTER
Pt called requesting refill of pain medication.   Pt states she has a few left, but does not want to run out over the weekend.     Last fill: 1/30/23, qty 24  Last OV: 1/30/23  Next OV: 2/8/23

## 2023-02-08 ENCOUNTER — OFFICE VISIT (OUTPATIENT)
Dept: ORTHOPEDIC SURGERY | Facility: CLINIC | Age: 48
End: 2023-02-08
Payer: COMMERCIAL

## 2023-02-08 VITALS — HEIGHT: 62 IN | BODY MASS INDEX: 27.62 KG/M2 | TEMPERATURE: 97.5 F | WEIGHT: 150.1 LBS

## 2023-02-08 DIAGNOSIS — S52.531A CLOSED COLLES' FRACTURE OF RIGHT RADIUS, INITIAL ENCOUNTER: Primary | ICD-10-CM

## 2023-02-08 DIAGNOSIS — Z09 FRACTURE FOLLOW-UP: ICD-10-CM

## 2023-02-08 PROCEDURE — 73110 X-RAY EXAM OF WRIST: CPT | Performed by: ORTHOPAEDIC SURGERY

## 2023-02-08 PROCEDURE — 99024 POSTOP FOLLOW-UP VISIT: CPT | Performed by: ORTHOPAEDIC SURGERY

## 2023-02-08 RX ORDER — HYDROCODONE BITARTRATE AND ACETAMINOPHEN 5; 325 MG/1; MG/1
1 TABLET ORAL EVERY 4 HOURS PRN
Qty: 50 TABLET | Refills: 0 | Status: SHIPPED | OUTPATIENT
Start: 2023-02-08 | End: 2023-02-17

## 2023-02-08 NOTE — PROGRESS NOTES
Rama Meza : 1975 MRN: [id number] DATE: 2023    CC: Closed treatment right distal radius fracture    HPI: The patient returns to clinic today stating pain is improving.  No complaints.  Pain is well-controlled.  She is only taking Norco at night.      Vitals:    23 1605   Temp: 97.5 °F (36.4 °C)       Exam:  Cast in place.  Skin intact and benign about margins of cast.  Moves fingers well and without discomfort.  Good sensory function in the fingers and thumb.  Brisk cap refill      Imaging  2v xrays including AP and lateral views of the wrist are ordered and reviewed by me to evaluate alignment and for comparison purposes.  She has flattening of the normal radial inclination.  I would say this is about the same as previous x-rays, perhaps just a degree or 2 worse.  She has slight dorsal tilt on the lateral view, less than 10 degrees.    Impression:  2 weeks s/p closed treatment right distal radius fracture    Plan:  I showed her the x-rays and we discussed the significance of the findings.  I told her that she is probably going to notice some deformity of the wrist due to the loss of radial inclination.  I do not expect that this would significantly alter her function in the long-term but that is a possibility.  She says that she does not want surgery for this.  She accepts that there may be some deformity.  Her cast seems to be fitting well.  She says she would prefer a black 1 but the pink 1 seems to be fitting fine so I told her I would recommend we leave this.  I am going to have her come back in another week for repeat x-rays.  I will be out of town so she will have to see Rosario at that visit.  She can see me the visit thereafter.  We did discuss appropriate activity modification and restrictions for her.  She is in agreement with that plan.  She did request a refill of the hydrocodone.  I have called that in for her.  Risk were discussed.    Ralph Ray MD

## 2023-02-15 ENCOUNTER — OFFICE VISIT (OUTPATIENT)
Dept: ORTHOPEDIC SURGERY | Facility: CLINIC | Age: 48
End: 2023-02-15
Payer: COMMERCIAL

## 2023-02-15 VITALS — HEIGHT: 62 IN | TEMPERATURE: 96.9 F | BODY MASS INDEX: 27.6 KG/M2 | WEIGHT: 150 LBS

## 2023-02-15 DIAGNOSIS — Z09 FRACTURE FOLLOW-UP: Primary | ICD-10-CM

## 2023-02-15 DIAGNOSIS — S52.531D CLOSED COLLES' FRACTURE OF RIGHT RADIUS WITH ROUTINE HEALING, SUBSEQUENT ENCOUNTER: ICD-10-CM

## 2023-02-15 PROCEDURE — 99024 POSTOP FOLLOW-UP VISIT: CPT | Performed by: NURSE PRACTITIONER

## 2023-02-15 PROCEDURE — 73110 X-RAY EXAM OF WRIST: CPT | Performed by: NURSE PRACTITIONER

## 2023-02-17 NOTE — PROGRESS NOTES
Rama Meza : 1975 MRN: [id number] DATE: 2023    CC: Closed treatment right distal radius fracture    HPI: The patient returns to clinic today for follow up.  Reports the cast is uncomfortable.  She localized the majority of her discomfort along the distal ulna.  She says using a sling make her pain worse.  She has significant difficulty getting comfortable at night despite using multiple pillows for positioning.  She is only using Tylenol for pain as she does not like the way she feels when taking Norco.      Vitals:    02/15/23 1334   Temp: 96.9 °F (36.1 °C)       Exam:  Cast in place and appears to be well fitting.  Skin intact and benign about margins of cast.  Fingers with mild edema.  Moves fingers well and with mild discomfort localized to the ulna.  Good sensory function in the fingers and thumb.  Brisk cap refill      Imaging  2v xrays including AP and lateral views of the wrist are ordered and reviewed by me to evaluate alignment and for comparison purposes.  No new or concerning findings noted. Alignment is unchanged.    Impression:  3 weeks s/p closed treatment right distal radius fracture    Plan:  The alignment appears unchanged.  I explained that I cannot replace her cast today, however, she can come back on Monday when Dr. Ray returns to clinic and have it replaced then.  She agreed.     Rosario Joiner, APRN     02/15/2023

## 2023-02-20 ENCOUNTER — OFFICE VISIT (OUTPATIENT)
Dept: ORTHOPEDIC SURGERY | Facility: CLINIC | Age: 48
End: 2023-02-20
Payer: COMMERCIAL

## 2023-02-20 VITALS — BODY MASS INDEX: 27.62 KG/M2 | HEIGHT: 62 IN | WEIGHT: 150.1 LBS | TEMPERATURE: 97.1 F

## 2023-02-20 DIAGNOSIS — Z09 SURGERY FOLLOW-UP: Primary | ICD-10-CM

## 2023-02-20 PROCEDURE — 29075 APPL CST ELBW FNGR SHORT ARM: CPT | Performed by: ORTHOPAEDIC SURGERY

## 2023-02-20 PROCEDURE — 99024 POSTOP FOLLOW-UP VISIT: CPT | Performed by: ORTHOPAEDIC SURGERY

## 2023-02-20 NOTE — PROGRESS NOTES
Ms. Downs follows up today for her right wrist.  She saw Rosario recently and was complaining of some pain related to her cast.  She is following up with me today for that same complaint.  She says her fingers and wrist feels stiff to her.  She says the swelling has gotten better but she still has some residual swelling in the hand and fingers.    The cast was removed.  Her skin looks fine.  There is no skin breakdown anywhere beneath the cast nor is there any evidence for any rubbing or abrasion.  She is  diffusely over the distal radius.  She has some ulnar styloid tenderness as well but this is more mild.  Mild residual edema in the wrist.  It does look a lot better from what I recall.  Her fingers are not too swollen although she does have a lot of stiffness in her fingers and thumb.    AP, oblique and lateral views right wrist are ordered and reviewed to evaluate her fracture.  These compared to previous x-rays.  Alignment of the fracture looks about the same.  She has loss of radial inclination and less than 10 degrees dorsal tilt.  She is basically ulnar neutral, may be a millimeter or so ulnar negative.  Slight widening of the scapholunate interval although it is difficult to adequately evaluate on the limited views.    Assessment: 4 weeks status post closed treatment right distal radius fracture    Plan: I recommended converting her to a brace.  I think she would tolerate that better.  She did not want to do that.  She felt like the cast is more supportive and she wanted to go with another cast today.  A well-padded, well molded fiberglass cast was placed without complication.  I am going to have her follow-up with me in 2 weeks for cast removal.  I did  her that it is important that she start working on finger and hand range of motion.  I am concerned that her fingers and thumb are quite stiff and I really would like to get her moving a little bit more.  Appropriate activity modifications and  restrictions with regards to weight lifting were discussed.    Ralph Ray MD

## 2023-03-06 ENCOUNTER — OFFICE VISIT (OUTPATIENT)
Dept: ORTHOPEDIC SURGERY | Facility: CLINIC | Age: 48
End: 2023-03-06
Payer: COMMERCIAL

## 2023-03-06 VITALS — BODY MASS INDEX: 27.62 KG/M2 | TEMPERATURE: 98 F | HEIGHT: 62 IN | WEIGHT: 150.1 LBS

## 2023-03-06 DIAGNOSIS — Z09 FRACTURE FOLLOW-UP: Primary | ICD-10-CM

## 2023-03-06 PROCEDURE — 99024 POSTOP FOLLOW-UP VISIT: CPT | Performed by: ORTHOPAEDIC SURGERY

## 2023-03-06 PROCEDURE — 73110 X-RAY EXAM OF WRIST: CPT | Performed by: ORTHOPAEDIC SURGERY

## 2023-03-06 NOTE — PROGRESS NOTES
Rama Meza : 1975 MRN: [id number] DATE: 3/6/2023    CC: Closed treatment of right distal radius fracture    HPI: Patient returns to clinic today stating pain is improved.  No new problems or concerns.  Cast is well-fitting.    Vitals:    23 1113   Temp: 98 °F (36.7 °C)        Exam:  Cast is well-fitting.  No skin breakdown around the margins.  Cast was removed and the skin looks benign.  Very mild tenderness to deep palpation only.  Wrist is a little stiff as expected.  Good motor and sensory function in the fingers.  Good cap refill.      Imaging  AP, oblique and lateral views of the wrist are ordered and reviewed for comparison purposes and to evaluate healing.  X-rays show alignment unchanged.  There appears to be interval callous formation.    Impression: 6-week status post closed treatment of right distal radius fracture    Plan:  1.  Convert to removable splint.  She would like a referral for OT so that was entered.  2.  Advised her about continuing to refrain from lifting, pushing or pulling with that arm.  3.  Follow up in 4 weeks with repeat 3v x-rays.    Ralph Ray MD    2023

## 2023-03-07 ENCOUNTER — TELEPHONE (OUTPATIENT)
Dept: ORTHOPEDIC SURGERY | Facility: CLINIC | Age: 48
End: 2023-03-07

## 2023-03-07 NOTE — TELEPHONE ENCOUNTER
Caller: PATIENT    Relationship: SELF     Best call back number: [phone]    What orders are you requesting (i.e. lab or imaging): OCCUPATIONAL THERAPY FOR THE RIGHT WRIST.    Where will you receive your lab/imaging services:  [facility]  [address] [place] [address]  PHONE NUMBER: [phone]  FAX NUMBER: [phone]    Additional notes: PATIENT WAS CALLING TO REQUEST AN ORDER FOR OCCUPATIONAL THERAPY FOR THE RIGHT WRIST BE FAXED TO [facility]. THANK YOU!

## 2023-03-16 ENCOUNTER — TELEPHONE (OUTPATIENT)
Dept: ORTHOPEDIC SURGERY | Facility: CLINIC | Age: 48
End: 2023-03-16
Payer: COMMERCIAL

## 2023-03-16 NOTE — TELEPHONE ENCOUNTER
Nataly from [facility] her number is [phone].  She want to know if patient is clinically healed to start stengthening exercise.

## 2023-03-27 ENCOUNTER — OFFICE VISIT (OUTPATIENT)
Dept: ORTHOPEDIC SURGERY | Facility: CLINIC | Age: 48
End: 2023-03-27
Payer: COMMERCIAL

## 2023-03-27 VITALS — BODY MASS INDEX: 27.94 KG/M2 | WEIGHT: 151.8 LBS | HEIGHT: 62 IN | TEMPERATURE: 97.4 F

## 2023-03-27 DIAGNOSIS — Z09 FRACTURE FOLLOW-UP: Primary | ICD-10-CM

## 2023-03-27 PROCEDURE — 73110 X-RAY EXAM OF WRIST: CPT | Performed by: ORTHOPAEDIC SURGERY

## 2023-03-27 PROCEDURE — 99024 POSTOP FOLLOW-UP VISIT: CPT | Performed by: ORTHOPAEDIC SURGERY

## 2023-03-27 NOTE — PROGRESS NOTES
Rama Meza : 1975 MRN: [id number] DATE: 3/27/2023    CC: 2 months s/p closed treatment right distal radius fracture    HPI:  Patient returns to clinic today stating pain is improved but not resolved.  Reports motion is steadily improving as well.  PT is helping.      Vitals:    23 0915   Temp: 97.4 °F (36.3 °C)     Exam:  Skin appears benign.  Mild tenderness on exam.  Wrist motion is improved but she is still very stiff--supination and dorsiflexion are both still poor relative to the contralateral side.  Good , pinch, and finger and thumb abduction strength.  Intact sensation.  Brisk cap refill.    Imaging: 3 view x-rays including AP, oblique and lateral views of the wrist are ordered and reviewed by me to evaluate alignment and for comparison purposes.  No new or concerning findings noted. There has been significant interval callous formation.      Impression:  2 months s/p closed treatment right distal radius fracture    Plan:  She demonstrates evidence for both clinical and radiographic healing.  Unfortunately she is very stiff.  I encouraged her to keep working on the range of motion both with formal therapy and on her own.  I encouraged her that high-frequency short duration therapy at home could be very helpful and I demonstrated some exercises for her.  At this point, recommend she discontinue use of the brace.  Okay to gradually resume activity without restriction.  Follow-up in 6 weeks    Ralph Ray MD    2023

## 2023-05-08 ENCOUNTER — OFFICE VISIT (OUTPATIENT)
Dept: ORTHOPEDIC SURGERY | Facility: CLINIC | Age: 48
End: 2023-05-08
Payer: COMMERCIAL

## 2023-05-08 VITALS — TEMPERATURE: 98 F | WEIGHT: 151.4 LBS | BODY MASS INDEX: 27.86 KG/M2 | HEIGHT: 62 IN

## 2023-05-08 DIAGNOSIS — M25.531 RIGHT WRIST PAIN: Primary | ICD-10-CM

## 2023-05-08 PROCEDURE — 99212 OFFICE O/P EST SF 10 MIN: CPT | Performed by: ORTHOPAEDIC SURGERY

## 2023-05-08 NOTE — PROGRESS NOTES
Rama Meza : 1975 MRN: [id number] DATE: 2023    CC: 3 months s/p closed treatment right distal radius fracture    HPI:  Ms. Downs is now 3 months out from closed treatment of her right wrist fracture.  Overall, she says the wrist is better but I get the sense that she is still very dissatisfied with how she is doing.  She presented me a therapy note from [facility].  Her therapist indicates that she has made very good progress.  The patient however reports that she is still having a lot of difficulty volar flexing the wrist.  She feels like there is something blocking her motion.  Over the past couple of weeks she has also started to experience tingling into her fingers.  The symptoms come and go.  Denies any weakness or numbness.  She reports that her pain still remains significant at this point.    Vitals:    23 0840   Temp: 98 °F (36.7 °C)       Exam:  Skin appears benign.  There is subtle curvature of the contour of the right wrist as compared to the left.  She is mildly tender over the distal radius fracture site, moderately tender over the radiocarpal articulation and moderately tender over the ulnar styloid.  Her dorsiflexion is pretty good, say 60 degrees or so.  Volar flexion is limited to about 10 to 15 degrees.  Pronation and supination are both about 60 to 70 degrees.  Radial deviation is uncomfortable for her.  She can , pinch and abduct her fingers with good strength.  She does have normal sensation in her fingers.  Brisk capillary refill.    Imaging: 3 view x-rays including AP, oblique and lateral views of the wrist are ordered and reviewed by me to evaluate alignment and for comparison purposes.  Her x-rays show no change in alignment.  She has loss of radial inclination of about 10 degrees by my estimate.  Looks like she is 1 mm ulnar negative.  Tilt looks like it is 5 degrees or so on the lateral.  She does have widening of the scapholunate interval    Impression:  3 months s/p closed  treatment right distal radius fracture with scapholunate ligament injury, suspected early carpal tunnel syndrome    Plan: I think the nerve symptoms may be some early carpal tunnel which has just started over the past couple of weeks.  I suggested she try wearing the brace at night.  I showed her the x-rays and discussed the findings.  We discussed the natural history of this condition.  I explained that there are potentially surgeries that could be done to reconstruct this ligament but she would need to talk to a hand specialist about that option.  I did explain that this would potentially involve a long rehab and recovery.  She is not sure that she is interested in pursuing that.  Her therapist indicates that she is making good progress but I get the sense that she is not all that happy with how things are going.  Even though it sounds like she is not interested in pursuing surgical options, I think it may be best at this point to get her over to a hand surgeon to at least discuss how best to proceed.  She is in agreement with that plan.  She will follow-up with me as needed.    Ralph Ray MD    05/08/2023